# Patient Record
Sex: FEMALE | URBAN - METROPOLITAN AREA
[De-identification: names, ages, dates, MRNs, and addresses within clinical notes are randomized per-mention and may not be internally consistent; named-entity substitution may affect disease eponyms.]

---

## 2022-05-05 ENCOUNTER — OFFICE VISIT (OUTPATIENT)
Dept: URBAN - METROPOLITAN AREA CLINIC 11 | Facility: CLINIC | Age: 62
End: 2022-05-05
Payer: COMMERCIAL

## 2022-05-05 DIAGNOSIS — H52.4 PRESBYOPIA: ICD-10-CM

## 2022-05-05 DIAGNOSIS — E11.3393 TYPE 2 DIAB W MODERATE NONPRLF DIAB RTNOP W/O MACULAR EDEMA, BILATERAL: Primary | ICD-10-CM

## 2022-05-05 DIAGNOSIS — H40.013 OPEN ANGLE WITH BORDERLINE FINDINGS, LOW RISK, BILATERAL: ICD-10-CM

## 2022-05-05 PROCEDURE — 92004 COMPRE OPH EXAM NEW PT 1/>: CPT | Performed by: OPTOMETRIST

## 2022-05-05 ASSESSMENT — INTRAOCULAR PRESSURE
OS: 15
OD: 14

## 2022-05-05 ASSESSMENT — VISUAL ACUITY
OD: 20/25
OS: 20/25

## 2022-05-05 ASSESSMENT — KERATOMETRY
OS: 46.00
OD: 44.63

## 2022-05-05 NOTE — IMPRESSION/PLAN
Impression: Presbyopia: H52.4. Plan: Glasses help improve patient's vision. Glasses prescription provided today.

## 2022-05-05 NOTE — IMPRESSION/PLAN
Impression: Type 2 diab w moderate nonprlf diab rtnop w/o macular edema, bilateral: C61.2322.
- Moderate NPDR OD// Severe NPDR w/o mac edema OS  Plan: Patient advised of diabetic retinopathy noted OU. Optos obtained. No treatment necessary at this time, although close monitoring is recommended. Patient advised of the importance of continued BG control and care with PCP.  RTC 3 months Optos /DFE

## 2022-05-05 NOTE — IMPRESSION/PLAN
Impression: Open angle with borderline findings, low risk, bilateral: H40.013. Plan: Discussed findings of today's exam with patient, including diagnosis in detail. Would like patient to return for testing to rule out Glaucoma. Recommend OCT, Pachymetry, Gonioscopy and Glaucoma Evaluation next available. Patient aware this appointment will take longer than the average appointment. Call with questions, changes in vision or new symptoms. RTC: 1 month RNFL OCT/Pachy/gonio